# Patient Record
Sex: MALE | Race: WHITE | Employment: STUDENT | URBAN - METROPOLITAN AREA
[De-identification: names, ages, dates, MRNs, and addresses within clinical notes are randomized per-mention and may not be internally consistent; named-entity substitution may affect disease eponyms.]

---
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
## 2024-03-30 ENCOUNTER — APPOINTMENT (OUTPATIENT)
Dept: GENERAL RADIOLOGY | Age: 24
End: 2024-03-30
Attending: NURSE PRACTITIONER
Payer: COMMERCIAL

## 2024-03-30 ENCOUNTER — HOSPITAL ENCOUNTER (OUTPATIENT)
Age: 24
Discharge: HOME | End: 2024-03-30
Payer: COMMERCIAL

## 2024-03-30 VITALS
OXYGEN SATURATION: 97 % | DIASTOLIC BLOOD PRESSURE: 76 MMHG | SYSTOLIC BLOOD PRESSURE: 112 MMHG | TEMPERATURE: 98 F | RESPIRATION RATE: 18 BRPM | HEART RATE: 94 BPM

## 2024-03-30 DIAGNOSIS — S62.91XA CLOSED FRACTURE OF RIGHT HAND, INITIAL ENCOUNTER: Primary | ICD-10-CM

## 2024-03-30 PROCEDURE — 29125 APPL SHORT ARM SPLINT STATIC: CPT | Performed by: NURSE PRACTITIONER

## 2024-03-30 PROCEDURE — 73130 X-RAY EXAM OF HAND: CPT | Performed by: NURSE PRACTITIONER

## 2024-03-30 PROCEDURE — 99203 OFFICE O/P NEW LOW 30 MIN: CPT | Performed by: NURSE PRACTITIONER

## 2024-03-30 PROCEDURE — A4565 SLINGS: HCPCS | Performed by: NURSE PRACTITIONER

## 2024-03-30 NOTE — DISCHARGE INSTRUCTIONS
Keep the mold on until you follow up with the orthopedic (bone) specialist. Do not get the mold wet. Keep it covered with plastic bags/garbage bags in the shower/bath/rain. Keep the extremity elevated as much as possible to minimize swelling. Do not put your body weight on the mold as it can break. You can take Motrin and Tylenol as you need to for discomfort. You should always be able to feel and move your fingers. Make an apt to be seen by the orthopedic specialist within the next week. Seek additional care in the ER immediately for any finger discoloration or numbness, severe pain, or new/worsening symptoms.
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
## (undated) NOTE — LETTER
[address] [place], [place] [id number]  Dept: [phone]  Dept Fax: [phone]      February 13, 2017    Patient: Jennifer Flores   Date of Visit: 2/3/2017       To Whom It May Concern:    Jennifer Flores was seen a

## (undated) NOTE — LETTER
Date & Time: 11/15/2018, 3:37 PM  Patient: Jennifer Flores  Encounter Provider(s):    Gokul Alejandro MD       To Whom It May Concern:    Jennifer Flores was seen and treated in our department on 9/6/2018.     If you have any questions or concerns, please d

## (undated) NOTE — ED AVS SNAPSHOT
[facility] in [place] [address]    Phone:  [phone]    Fax:  [phone]   MRN: [id number]    Department:  [facility]   Date of Visit:  2/ deductible, co-payment, or co-insurance and for other services not covered under your health insurance plan. Please contact your insurance company and physician's office to determine coverage and benefits available for follow-up care and referrals.      It continue to take your medications as instructed by your Primary Care doctor until you can check with your doctor. Please bring the medication list to your next doctor's appointment.     Any imaging studies and labs completed today can be reviewed in your M can help with your Affordable Care Act coverage, as well as all types of Medicaid plans. To get signed up and covered, please call [phone] and ask to get set up for an insurance coverage that is in-network with [facility]

## (undated) NOTE — ED AVS SNAPSHOT
[facility] in [place] [address]    Phone:  [phone]    Fax:  [phone]   MRN: [id number]    Department:  [facility]   Date of Visit:  2/ follow-up care and referrals. It is our goal to assure that you are completely satisfied with every aspect of your visit today.   In an effort to constantly improve our service to you, we would appreciate any positive or negative feedback related to the [employer] account. You may have had testing done that requires us to contact you. Please make sure we have your correct phone number on file.      OUR CURRENT HOURS OF OPERATION:  MONDAY THROUGH FRIDAY 8AM - 8PM  WEEKENDS AND HOLIDAYS 8AM - 6PM    I certifi visit, view other health information and more. To sign up or find more information on getting   Proxy Access to your child’s MyChart go to [email or web address] and click on the   Sign Up Forms link in the Additional Information box on the right.

## (undated) NOTE — LETTER
[address] [place] [id number]  Dept: [phone]  Dept Fax: [phone]      February 20, 2017    Patient: Precious Adams   Date of Visit: 2/20/2017       To Whom It May Concern:    Precious Adams was seen